# Patient Record
Sex: MALE | Race: ASIAN | Employment: OTHER | URBAN - METROPOLITAN AREA
[De-identification: names, ages, dates, MRNs, and addresses within clinical notes are randomized per-mention and may not be internally consistent; named-entity substitution may affect disease eponyms.]

---

## 2017-08-02 ENCOUNTER — HOSPITAL ENCOUNTER (OUTPATIENT)
Dept: MRI IMAGING | Facility: HOSPITAL | Age: 67
Discharge: HOME | End: 2017-08-02
Attending: SPECIALIST
Payer: COMMERCIAL

## 2017-08-02 DIAGNOSIS — M54.12 CERVICAL RADICULOPATHY: ICD-10-CM

## 2017-08-02 PROCEDURE — 72141 MRI NECK SPINE W/O DYE: CPT | Performed by: SPECIALIST

## 2019-09-30 ENCOUNTER — OFFICE VISIT (OUTPATIENT)
Dept: SURGERY | Facility: CLINIC | Age: 69
End: 2019-09-30
Payer: MEDICARE

## 2019-09-30 VITALS
SYSTOLIC BLOOD PRESSURE: 119 MMHG | HEIGHT: 68 IN | TEMPERATURE: 97 F | BODY MASS INDEX: 25.76 KG/M2 | HEART RATE: 67 BPM | WEIGHT: 170 LBS | DIASTOLIC BLOOD PRESSURE: 75 MMHG

## 2019-09-30 DIAGNOSIS — K64.8 PROLAPSED HEMORRHOIDS: ICD-10-CM

## 2019-09-30 DIAGNOSIS — K64.2 GRADE III INTERNAL HEMORRHOIDS: Primary | ICD-10-CM

## 2019-09-30 PROCEDURE — 46600 DIAGNOSTIC ANOSCOPY SPX: CPT | Performed by: COLON & RECTAL SURGERY

## 2019-09-30 PROCEDURE — 99204 OFFICE O/P NEW MOD 45 MIN: CPT | Performed by: COLON & RECTAL SURGERY

## 2019-09-30 NOTE — H&P
New Patient Visit Note       Active Problems      1. Grade III internal hemorrhoids    2.  Prolapsed hemorrhoids        Chief Complaint   Patient presents with:  Hemorrhoids: NP referred by Dr. Jose Hastings for hemorrhoids, states bleeding with bowel movements, history have been reviewed by me today.     Past Medical History:   Diagnosis Date   • High cholesterol    • Unspecified essential hypertension      Past Surgical History:   Procedure Laterality Date   • HERNIA SURGERY  12/17/2012    Left inguinal direct he for tremors, syncope and weakness. Hematological: Negative for adenopathy. Does not bruise/bleed easily. Psychiatric/Behavioral: Negative for behavioral problems and sleep disturbance.        Physical Findings   /75   Pulse 67   Temp 97.3 °F (36.3 the hemorrhoid is prolapse. He has no pain during defecation. He does not feel obstruction to defecation. He has no diarrhea or constipation. He does see bright red blood per rectum, blood on the toilet paper, and blood in the toilet.   This happens 2019.       No orders of the defined types were placed in this encounter. Imaging & Referrals   None    Follow Up  Return in 3 months (on 12/20/2019).     Su Fernández MD

## 2019-10-01 PROBLEM — K64.8 PROLAPSED HEMORRHOIDS: Status: ACTIVE | Noted: 2019-10-01

## 2019-10-01 PROBLEM — K64.2 GRADE III INTERNAL HEMORRHOIDS: Status: ACTIVE | Noted: 2019-10-01

## 2019-10-01 NOTE — PROCEDURES
Procedure:  Anoscopy    Surgeon: Terrance Tan    Anesthesia: None    Findings: See the progress note attached for all findings    Operative Summary: The patient was placed in a prone position on the proctoscopy table, the hips were flexed in the jackknife p

## 2019-10-01 NOTE — PATIENT INSTRUCTIONS
I am seeing this patient in consultation from Dr. Zeynep Rivera regarding severe anal and rectal symptoms. This patient is a . He rides in his car for 15 hours a day. He has applied ointment to his hemorrhoids.   This gave a small amount of operation for him would be a PPH stapled hemorrhoidectomy. I reviewed the procedure with him. All risks, benefits, complications and alternatives to the proposed operation were fully discussed with the patient.  All questions from the patient were answe

## 2019-12-11 RX ORDER — ACETAMINOPHEN 500 MG
1000 TABLET ORAL ONCE
Status: CANCELLED | OUTPATIENT
Start: 2019-12-11 | End: 2019-12-11

## 2019-12-14 ENCOUNTER — APPOINTMENT (OUTPATIENT)
Dept: LAB | Facility: HOSPITAL | Age: 69
End: 2019-12-14
Payer: MEDICARE

## 2019-12-14 DIAGNOSIS — K64.8 PROLAPSED HEMORRHOIDS: ICD-10-CM

## 2019-12-14 DIAGNOSIS — I10 HTN (HYPERTENSION): ICD-10-CM

## 2019-12-14 DIAGNOSIS — K64.2 GRADE III INTERNAL HEMORRHOIDS: ICD-10-CM

## 2019-12-14 PROCEDURE — 93005 ELECTROCARDIOGRAM TRACING: CPT

## 2019-12-14 PROCEDURE — 80048 BASIC METABOLIC PNL TOTAL CA: CPT

## 2019-12-14 PROCEDURE — 36415 COLL VENOUS BLD VENIPUNCTURE: CPT

## 2019-12-14 PROCEDURE — 93010 ELECTROCARDIOGRAM REPORT: CPT | Performed by: INTERNAL MEDICINE

## 2019-12-20 ENCOUNTER — ANESTHESIA EVENT (OUTPATIENT)
Dept: SURGERY | Facility: HOSPITAL | Age: 69
End: 2019-12-20
Payer: MEDICARE

## 2019-12-20 ENCOUNTER — ANESTHESIA (OUTPATIENT)
Dept: SURGERY | Facility: HOSPITAL | Age: 69
End: 2019-12-20
Payer: MEDICARE

## 2019-12-20 ENCOUNTER — HOSPITAL ENCOUNTER (OUTPATIENT)
Facility: HOSPITAL | Age: 69
Setting detail: HOSPITAL OUTPATIENT SURGERY
Discharge: HOME | End: 2019-12-20
Attending: COLON & RECTAL SURGERY | Admitting: COLON & RECTAL SURGERY
Payer: MEDICARE

## 2019-12-20 VITALS
TEMPERATURE: 98 F | WEIGHT: 168.44 LBS | HEART RATE: 79 BPM | SYSTOLIC BLOOD PRESSURE: 143 MMHG | RESPIRATION RATE: 16 BRPM | OXYGEN SATURATION: 99 % | DIASTOLIC BLOOD PRESSURE: 79 MMHG | HEIGHT: 68 IN | BODY MASS INDEX: 25.53 KG/M2

## 2019-12-20 DIAGNOSIS — I10 HTN (HYPERTENSION): Primary | ICD-10-CM

## 2019-12-20 DIAGNOSIS — K64.2 GRADE III INTERNAL HEMORRHOIDS: ICD-10-CM

## 2019-12-20 DIAGNOSIS — K64.8 PROLAPSED HEMORRHOIDS: ICD-10-CM

## 2019-12-20 PROCEDURE — 06LY4CC OCCLUSION OF HEMORRHOIDAL PLEXUS WITH EXTRALUMINAL DEVICE, PERCUTANEOUS ENDOSCOPIC APPROACH: ICD-10-PCS | Performed by: COLON & RECTAL SURGERY

## 2019-12-20 RX ORDER — ONDANSETRON 2 MG/ML
INJECTION INTRAMUSCULAR; INTRAVENOUS AS NEEDED
Status: DISCONTINUED | OUTPATIENT
Start: 2019-12-20 | End: 2019-12-20 | Stop reason: SURG

## 2019-12-20 RX ORDER — DEXAMETHASONE SODIUM PHOSPHATE 4 MG/ML
VIAL (ML) INJECTION AS NEEDED
Status: DISCONTINUED | OUTPATIENT
Start: 2019-12-20 | End: 2019-12-20 | Stop reason: SURG

## 2019-12-20 RX ORDER — BUPIVACAINE HYDROCHLORIDE AND EPINEPHRINE 5; 5 MG/ML; UG/ML
INJECTION, SOLUTION EPIDURAL; INTRACAUDAL; PERINEURAL AS NEEDED
Status: DISCONTINUED | OUTPATIENT
Start: 2019-12-20 | End: 2019-12-20 | Stop reason: HOSPADM

## 2019-12-20 RX ORDER — HYDROCODONE BITARTRATE AND ACETAMINOPHEN 5; 325 MG/1; MG/1
1 TABLET ORAL EVERY 6 HOURS PRN
Qty: 30 TABLET | Refills: 0 | Status: ON HOLD | OUTPATIENT
Start: 2019-12-20 | End: 2020-12-23

## 2019-12-20 RX ORDER — NALOXONE HYDROCHLORIDE 0.4 MG/ML
80 INJECTION, SOLUTION INTRAMUSCULAR; INTRAVENOUS; SUBCUTANEOUS AS NEEDED
Status: DISCONTINUED | OUTPATIENT
Start: 2019-12-20 | End: 2019-12-20

## 2019-12-20 RX ORDER — HYDROCODONE BITARTRATE AND ACETAMINOPHEN 5; 325 MG/1; MG/1
1 TABLET ORAL AS NEEDED
Status: DISCONTINUED | OUTPATIENT
Start: 2019-12-20 | End: 2019-12-20

## 2019-12-20 RX ORDER — ONDANSETRON 2 MG/ML
4 INJECTION INTRAMUSCULAR; INTRAVENOUS AS NEEDED
Status: DISCONTINUED | OUTPATIENT
Start: 2019-12-20 | End: 2019-12-20

## 2019-12-20 RX ORDER — HEPARIN SODIUM 5000 [USP'U]/ML
5000 INJECTION, SOLUTION INTRAVENOUS; SUBCUTANEOUS ONCE
Status: COMPLETED | OUTPATIENT
Start: 2019-12-20 | End: 2019-12-20

## 2019-12-20 RX ORDER — ACETAMINOPHEN 500 MG
1000 TABLET ORAL EVERY 6 HOURS PRN
Status: ON HOLD | COMMUNITY
End: 2020-12-23

## 2019-12-20 RX ORDER — MIDAZOLAM HYDROCHLORIDE 1 MG/ML
INJECTION INTRAMUSCULAR; INTRAVENOUS AS NEEDED
Status: DISCONTINUED | OUTPATIENT
Start: 2019-12-20 | End: 2019-12-20 | Stop reason: SURG

## 2019-12-20 RX ORDER — HYDROCODONE BITARTRATE AND ACETAMINOPHEN 5; 325 MG/1; MG/1
2 TABLET ORAL AS NEEDED
Status: DISCONTINUED | OUTPATIENT
Start: 2019-12-20 | End: 2019-12-20

## 2019-12-20 RX ORDER — HYDROMORPHONE HYDROCHLORIDE 1 MG/ML
0.4 INJECTION, SOLUTION INTRAMUSCULAR; INTRAVENOUS; SUBCUTANEOUS EVERY 5 MIN PRN
Status: DISCONTINUED | OUTPATIENT
Start: 2019-12-20 | End: 2019-12-20

## 2019-12-20 RX ORDER — SODIUM CHLORIDE, SODIUM LACTATE, POTASSIUM CHLORIDE, CALCIUM CHLORIDE 600; 310; 30; 20 MG/100ML; MG/100ML; MG/100ML; MG/100ML
INJECTION, SOLUTION INTRAVENOUS CONTINUOUS
Status: DISCONTINUED | OUTPATIENT
Start: 2019-12-20 | End: 2019-12-20

## 2019-12-20 RX ORDER — ROCURONIUM BROMIDE 10 MG/ML
INJECTION, SOLUTION INTRAVENOUS AS NEEDED
Status: DISCONTINUED | OUTPATIENT
Start: 2019-12-20 | End: 2019-12-20 | Stop reason: SURG

## 2019-12-20 RX ADMIN — ROCURONIUM BROMIDE 20 MG: 10 INJECTION, SOLUTION INTRAVENOUS at 15:17:00

## 2019-12-20 RX ADMIN — ONDANSETRON 4 MG: 2 INJECTION INTRAMUSCULAR; INTRAVENOUS at 16:07:00

## 2019-12-20 RX ADMIN — DEXAMETHASONE SODIUM PHOSPHATE 8 MG: 4 MG/ML VIAL (ML) INJECTION at 15:42:00

## 2019-12-20 RX ADMIN — MIDAZOLAM HYDROCHLORIDE 2 MG: 1 INJECTION INTRAMUSCULAR; INTRAVENOUS at 15:17:00

## 2019-12-20 NOTE — ANESTHESIA PROCEDURE NOTES
Airway  Date/Time: 12/20/2019 3:20 PM  Urgency: elective      General Information and Staff    Patient location during procedure: OR  Anesthesiologist: Ricky Ambrose MD  Performed: anesthesiologist     Indications and Patient Condition  Indications

## 2019-12-20 NOTE — H&P
Active Problems      1. Grade III internal hemorrhoids    2.  Prolapsed hemorrhoids          Chief Complaint   Patient presents with:  Hemorrhoids: NP referred by Dr. Alicia Kim for hemorrhoids, states bleeding with bowel movements,         History of Present Illn Laterality Date   • HERNIA SURGERY   12/17/2012     Left inguinal direct hernia performed by Dr. George Alston at [facility]         The family history and social history have been reviewed by me today.            Family History   Problem Relation Age of Onset sleep disturbance. Physical Findings   /75   Pulse 67   Temp 97.3 °F (36.3 °C) (Oral)   Ht 68\"   Wt 170 lb (77.1 kg)   BMI 25.85 kg/m²   Physical Exam   Genitourinary: Rectal exam shows external hemorrhoid and internal hemorrhoid.  Rectal exa

## 2019-12-20 NOTE — ANESTHESIA POSTPROCEDURE EVALUATION
[address] [facility] Patient Status:  Hospital Outpatient Surgery   Age/Gender 68year old male MRN [id number]   Location [address] [facility] Attending Christopher Rosales MD   Hosp Day # 0 PCP MD Dilia Pacheco

## 2019-12-20 NOTE — OPERATIVE REPORT
[facility]   Operative Note    Illtylor Aleksandr Location: OR   [facility] [id number] MRN [id number]   Admission Date 12/20/2019 Operation Date 12/20/2019   Attending Physician Tra Cohen MD Operating Physician Monalisa Hodge MD     Pre-Operative Diagnosi held,  pulling the hemorrhoids into the jaws of the stapler. The stapler was fired. A complete ring was identified and sent to pathology for further histopathologic diagnosis.   A running locking suture line was used to reinforce the staple line as we

## 2019-12-20 NOTE — ANESTHESIA PREPROCEDURE EVALUATION
PRE-OP EVALUATION    Patient Name: Kelvin Storey    Pre-op Diagnosis: Grade III internal hemorrhoids [K64.2]  Prolapsed hemorrhoids [K64.8]    Procedure(s):  Procedure for Prolapsed Hemorrhoids STAPLED HEMORROIDECTOMY    Surgeon(s) and Role:     * Pi Never      Drug use: No     Available pre-op labs reviewed.      Lab Results   Component Value Date     12/14/2019    K 4.5 12/14/2019     12/14/2019    CO2 28.0 12/14/2019    BUN 19 (H) 12/14/2019    CREATSERUM 1.20 12/14/2019     (H) 12

## 2020-01-02 ENCOUNTER — TELEPHONE (OUTPATIENT)
Dept: SURGERY | Facility: CLINIC | Age: 70
End: 2020-01-02

## 2020-01-02 RX ORDER — HYDROCODONE BITARTRATE AND ACETAMINOPHEN 5; 325 MG/1; MG/1
1 TABLET ORAL EVERY 6 HOURS PRN
Qty: 10 TABLET | Refills: 0 | Status: SHIPPED | OUTPATIENT
Start: 2020-01-02 | End: 2020-01-06

## 2020-01-02 NOTE — TELEPHONE ENCOUNTER
Spoke with Albert SMITH and ok for additional #10 tablets. Med was KS'E electronically by him. Called patient and notified. He v/u. He is now requesting to also move appt date up. appt moved.     Future Appointments   Date Time Provider Blas Nunez

## 2020-01-02 NOTE — TELEPHONE ENCOUNTER
Patient calling s/p hemorrhoidectomy. C/o pain with sitting. States finished Norco 2 days ago and has been attempting advil without much relief. Doing sitz baths twice daily. Denies fever or chills. Denies rectal drainage. Mild spot-like bleeding.   I

## 2020-01-06 ENCOUNTER — OFFICE VISIT (OUTPATIENT)
Dept: SURGERY | Facility: CLINIC | Age: 70
End: 2020-01-06

## 2020-01-06 VITALS
BODY MASS INDEX: 26 KG/M2 | DIASTOLIC BLOOD PRESSURE: 74 MMHG | TEMPERATURE: 98 F | SYSTOLIC BLOOD PRESSURE: 145 MMHG | HEART RATE: 76 BPM | WEIGHT: 168 LBS

## 2020-01-06 DIAGNOSIS — K64.2 GRADE III INTERNAL HEMORRHOIDS: ICD-10-CM

## 2020-01-06 DIAGNOSIS — K64.8 PROLAPSED HEMORRHOIDS: Primary | ICD-10-CM

## 2020-01-06 PROCEDURE — 99024 POSTOP FOLLOW-UP VISIT: CPT | Performed by: COLON & RECTAL SURGERY

## 2020-01-06 NOTE — PROGRESS NOTES
Post Operative Visit Note       Active Problems  1. Prolapsed hemorrhoids    2.  Grade III internal hemorrhoids         Chief Complaint   Patient presents with:  Post-Op: p/o 12.20 Prolapsed Hemorrhoids STAPLED HEMORROIDECTOMY - states had hard time on satu patient required a further resection of a left posterior lateral mixed complex prolapsing hemorrhoid that was not pulled and with the [facility] technique. Allergies  Rhonda is allergic to azithromycin.     Past Medical / Imleda Fresh / Social / Family Rfl: 0      Review of Systems  The Review of Systems has been reviewed by me during today. Review of Systems   Constitutional: Negative for chills, diaphoresis, fatigue, fever and unexpected weight change.    HENT: Negative for hearing loss, nosebleeds, so hemorrhoids      Plan   This patient underwent an extensive hemorrhoidectomy on December 20, 2019. He had a PPH stapled hemorrhoidectomy.   In addition he had resection of the left posterior lateral external hemorrhoidal complex that required further resec

## 2020-01-06 NOTE — PATIENT INSTRUCTIONS
This patient underwent an extensive hemorrhoidectomy on December 20, 2019. He had a PPH stapled hemorrhoidectomy. In addition he had resection of the left posterior lateral external hemorrhoidal complex that required further resection.     He had consider

## 2020-11-10 ENCOUNTER — TELEPHONE (OUTPATIENT)
Dept: SURGERY | Facility: CLINIC | Age: 70
End: 2020-11-10

## 2020-11-10 ENCOUNTER — OFFICE VISIT (OUTPATIENT)
Dept: SURGERY | Facility: CLINIC | Age: 70
End: 2020-11-10
Payer: MEDICARE

## 2020-11-10 VITALS — DIASTOLIC BLOOD PRESSURE: 70 MMHG | TEMPERATURE: 97 F | SYSTOLIC BLOOD PRESSURE: 150 MMHG | HEART RATE: 79 BPM

## 2020-11-10 DIAGNOSIS — R82.90 URINE FINDING: Primary | ICD-10-CM

## 2020-11-10 DIAGNOSIS — R39.9 LOWER URINARY TRACT SYMPTOMS: ICD-10-CM

## 2020-11-10 DIAGNOSIS — N40.0 ENLARGED PROSTATE: ICD-10-CM

## 2020-11-10 PROCEDURE — 99203 OFFICE O/P NEW LOW 30 MIN: CPT | Performed by: UROLOGY

## 2020-11-10 PROCEDURE — 51798 US URINE CAPACITY MEASURE: CPT | Performed by: UROLOGY

## 2020-11-10 PROCEDURE — 81003 URINALYSIS AUTO W/O SCOPE: CPT | Performed by: UROLOGY

## 2020-11-10 RX ORDER — ALFUZOSIN HYDROCHLORIDE 10 MG/1
10 TABLET, EXTENDED RELEASE ORAL DAILY
COMMUNITY
End: 2021-02-03

## 2020-11-10 RX ORDER — FINASTERIDE 5 MG/1
5 TABLET, FILM COATED ORAL DAILY
Status: ON HOLD | COMMUNITY
End: 2020-12-23

## 2020-11-10 NOTE — PROGRESS NOTES
Rooming Clinician:     Christy Mahoney is a 65year old male. Patient presents with:  Consult: overreactive bladder; started on Finasteride/Alfuzosin since February. Was on [place] but now discontinue.   Urinary Frequency  Urinary Urgency    Miscellaneo Procedure Laterality Date   • HEMORRHOIDECTOMY     • HEMORRHOIDECTOMY WITH [address] N/A 12/20/2019    Performed by Bianca Márquez MD at [facility] MAIN OR   • HERNIA SURGERY  12/17/2012    Left inguinal direct hernia performed by Dr. Nayla Ramirez at [address] [place] [address] 12/14/2019     (H) 12/14/2019    CA 9.1 12/14/2019    ALB 3.9 10/13/2012    ALKPHO 93 10/13/2012    AST 14 (L) 10/13/2012    ALT 29 10/13/2012    POCGLU 108 (H) 12/17/2012       PSA:    Lab Results   Component Value Date    PSA 1.36 05/16/2012

## 2020-11-11 NOTE — PROGRESS NOTES
Your recent urine cytology is normal.  Recommend follow up in the office as directed.     Sincerely,  Macho Wagner MD

## 2020-11-12 ENCOUNTER — TELEPHONE (OUTPATIENT)
Dept: SURGERY | Facility: CLINIC | Age: 70
End: 2020-11-12

## 2020-11-12 NOTE — TELEPHONE ENCOUNTER
Spoke with patient and informed him that his urine cytology was normal.  Follow up as planned. Verbalized understanding.

## 2020-11-12 NOTE — TELEPHONE ENCOUNTER
----- Message from Gentry Fall MD sent at 11/11/2020 12:42 PM CST -----  Your recent urine cytology is normal.  Recommend follow up in the office as directed.     Sincerely,  More German MD

## 2020-11-18 ENCOUNTER — TELEPHONE (OUTPATIENT)
Dept: SURGERY | Facility: CLINIC | Age: 70
End: 2020-11-18

## 2020-11-30 ENCOUNTER — TELEPHONE (OUTPATIENT)
Dept: SURGERY | Facility: CLINIC | Age: 70
End: 2020-11-30

## 2020-11-30 ENCOUNTER — PROCEDURE (OUTPATIENT)
Dept: SURGERY | Facility: CLINIC | Age: 70
End: 2020-11-30
Payer: MEDICARE

## 2020-11-30 VITALS — HEART RATE: 66 BPM | DIASTOLIC BLOOD PRESSURE: 79 MMHG | TEMPERATURE: 98 F | SYSTOLIC BLOOD PRESSURE: 161 MMHG

## 2020-11-30 DIAGNOSIS — N13.8 BPH WITH OBSTRUCTION/LOWER URINARY TRACT SYMPTOMS: ICD-10-CM

## 2020-11-30 DIAGNOSIS — R82.90 URINE FINDING: Primary | ICD-10-CM

## 2020-11-30 DIAGNOSIS — N40.1 BPH WITH OBSTRUCTION/LOWER URINARY TRACT SYMPTOMS: Primary | ICD-10-CM

## 2020-11-30 DIAGNOSIS — N13.8 BPH WITH OBSTRUCTION/LOWER URINARY TRACT SYMPTOMS: Primary | ICD-10-CM

## 2020-11-30 DIAGNOSIS — N40.1 BPH WITH OBSTRUCTION/LOWER URINARY TRACT SYMPTOMS: ICD-10-CM

## 2020-11-30 PROCEDURE — 51741 ELECTRO-UROFLOWMETRY FIRST: CPT | Performed by: UROLOGY

## 2020-11-30 PROCEDURE — 52000 CYSTOURETHROSCOPY: CPT | Performed by: UROLOGY

## 2020-11-30 PROCEDURE — 81003 URINALYSIS AUTO W/O SCOPE: CPT | Performed by: UROLOGY

## 2020-11-30 RX ORDER — CIPROFLOXACIN 500 MG/1
500 TABLET, FILM COATED ORAL ONCE
Status: SHIPPED | OUTPATIENT
Start: 2020-11-30

## 2020-11-30 NOTE — PROGRESS NOTES
Rooming Clinician:     Jovita Newsome is a 65year old male. Patient presents with:  Procedure: here for Uroflow and cystoscopy        HPI:     Patient returns to the office for follow-up examination.   He has a history of a slow urinary stream with s otherwise  SKIN: denies any unusual skin lesions or rashes  RESPIRATORY: denies shortness of breath with exertion  CARDIOVASCULAR: denies chest pain on exertion  GI: denies abdominal pain and denies heartburn  : see HPI  NEURO: no sensory or motor compla  12/14/2019    K 4.5 12/14/2019    PSA 2.2 09/10/2020     12/14/2019    CO2 28.0 12/14/2019     (H) 12/14/2019    CA 9.1 12/14/2019    ALB 3.9 10/13/2012    ALKPHO 93 10/13/2012    AST 14 (L) 10/13/2012    ALT 29 10/13/2012    POCGLU Darren Villareal M.D.   Urology

## 2020-11-30 NOTE — TELEPHONE ENCOUNTER
Surgeon: [facility]/ASC : CHLOÉ  Assistant:   Location:   Procedure: CYSTO, TURP  Anesthesia: SPINAL  Time frame: SOON  Diagnosis: BPH WITH LUTS    Special instructions/equipment:  Postop follow-up:

## 2020-12-01 NOTE — TELEPHONE ENCOUNTER
Patient called back this date requesting surgery be scheduled for 12/23/20 which was done so this date. Reviewed pre-op. Instructions and patient voiced understanding. Also sent instructions thru My Chart this date.  Patient requests call back from lorenza

## 2020-12-01 NOTE — TELEPHONE ENCOUNTER
Called patient this date to schedule surgery. Left message for patient to call back at [phone] to schedule.

## 2020-12-02 NOTE — TELEPHONE ENCOUNTER
Patient has questions regarding incontinence after the TURP. I told him most men regain bladder control and fully recover in 6-12 months. He would like to know in his case what the odds are of him having permanent incontinence.

## 2020-12-04 NOTE — TELEPHONE ENCOUNTER
Patient indicates he had a missed, call, if needed to contact patient again, please call at:[phone],thanks.

## 2020-12-04 NOTE — TELEPHONE ENCOUNTER
Spoke with patient and answered all his questions regarding possible post op issues. Verbalized understanding.

## 2020-12-20 ENCOUNTER — LAB ENCOUNTER (OUTPATIENT)
Dept: LAB | Facility: HOSPITAL | Age: 70
End: 2020-12-20
Attending: UROLOGY
Payer: MEDICARE

## 2020-12-20 DIAGNOSIS — N13.8 BPH WITH OBSTRUCTION/LOWER URINARY TRACT SYMPTOMS: ICD-10-CM

## 2020-12-20 DIAGNOSIS — N40.1 BPH WITH OBSTRUCTION/LOWER URINARY TRACT SYMPTOMS: ICD-10-CM

## 2020-12-20 DIAGNOSIS — N18.30 CHRONIC KIDNEY DISEASE, STAGE III (MODERATE) (HCC): Primary | ICD-10-CM

## 2020-12-20 PROCEDURE — 36415 COLL VENOUS BLD VENIPUNCTURE: CPT

## 2020-12-20 PROCEDURE — 80048 BASIC METABOLIC PNL TOTAL CA: CPT

## 2020-12-21 NOTE — H&P
Patient has a history of some urinary urgency over 1 to 2 years and was subsequently started on alfuzosin which then reduced the urgency. Is able to hold the urine for least a half an hour longer.   Stream had been diminished prior to initiation of David Avilez MD at [facility] MAIN OR   • HERNIA SURGERY   12/17/2012     Left inguinal direct hernia performed by Dr. Mik Bro at [address] [place] [address] History:  Social History    Tobacco Use      Smoking status: Former Smoker        Quit date: 7/1/2010 normal urothelium. Musculature was 2+ trabeculated. The ureteral offices were normal on a normal triangle. There were no stones or tumors seen. The patient tolerated the procedure well.  He was given a postoperative prophylactic dose of antibiotic and the required. Similarly when patients have trans urethral prostate surgery there is a rare risk of incontinence postoperatively but this is an extremely rare complication.   Patient 's are instructed to rest for the 1st several weeks with limited activities an

## 2020-12-23 ENCOUNTER — HOSPITAL ENCOUNTER (OUTPATIENT)
Facility: HOSPITAL | Age: 70
Discharge: HOME | End: 2020-12-24
Attending: UROLOGY | Admitting: UROLOGY
Payer: MEDICARE

## 2020-12-23 ENCOUNTER — ANESTHESIA (OUTPATIENT)
Dept: SURGERY | Facility: HOSPITAL | Age: 70
End: 2020-12-23
Payer: MEDICARE

## 2020-12-23 ENCOUNTER — ANESTHESIA EVENT (OUTPATIENT)
Dept: SURGERY | Facility: HOSPITAL | Age: 70
End: 2020-12-23
Payer: MEDICARE

## 2020-12-23 DIAGNOSIS — N13.8 BPH WITH OBSTRUCTION/LOWER URINARY TRACT SYMPTOMS: Primary | ICD-10-CM

## 2020-12-23 DIAGNOSIS — N40.1 BPH WITH OBSTRUCTION/LOWER URINARY TRACT SYMPTOMS: Primary | ICD-10-CM

## 2020-12-23 DIAGNOSIS — R82.90 URINE FINDING: ICD-10-CM

## 2020-12-23 PROCEDURE — 52601 PROSTATECTOMY (TURP): CPT | Performed by: UROLOGY

## 2020-12-23 PROCEDURE — 0VT08ZZ RESECTION OF PROSTATE, VIA NATURAL OR ARTIFICIAL OPENING ENDOSCOPIC: ICD-10-PCS | Performed by: UROLOGY

## 2020-12-23 RX ORDER — MEPERIDINE HYDROCHLORIDE 25 MG/ML
12.5 INJECTION INTRAMUSCULAR; INTRAVENOUS; SUBCUTANEOUS AS NEEDED
Status: DISCONTINUED | OUTPATIENT
Start: 2020-12-23 | End: 2020-12-23 | Stop reason: HOSPADM

## 2020-12-23 RX ORDER — ACETAMINOPHEN 325 MG/1
650 TABLET ORAL EVERY 4 HOURS PRN
Status: DISCONTINUED | OUTPATIENT
Start: 2020-12-23 | End: 2020-12-24

## 2020-12-23 RX ORDER — PRAVASTATIN SODIUM 20 MG
20 TABLET ORAL NIGHTLY
Status: DISCONTINUED | OUTPATIENT
Start: 2020-12-23 | End: 2020-12-24

## 2020-12-23 RX ORDER — SODIUM CHLORIDE, SODIUM LACTATE, POTASSIUM CHLORIDE, CALCIUM CHLORIDE 600; 310; 30; 20 MG/100ML; MG/100ML; MG/100ML; MG/100ML
INJECTION, SOLUTION INTRAVENOUS CONTINUOUS
Status: DISCONTINUED | OUTPATIENT
Start: 2020-12-23 | End: 2020-12-23

## 2020-12-23 RX ORDER — CEFAZOLIN SODIUM/WATER 2 G/20 ML
2 SYRINGE (ML) INTRAVENOUS ONCE
Status: COMPLETED | OUTPATIENT
Start: 2020-12-23 | End: 2020-12-23

## 2020-12-23 RX ORDER — DEXTROSE, SODIUM CHLORIDE, AND POTASSIUM CHLORIDE 5; .45; .15 G/100ML; G/100ML; G/100ML
INJECTION INTRAVENOUS
Status: COMPLETED
Start: 2020-12-23 | End: 2020-12-23

## 2020-12-23 RX ORDER — HYDROMORPHONE HYDROCHLORIDE 1 MG/ML
0.4 INJECTION, SOLUTION INTRAMUSCULAR; INTRAVENOUS; SUBCUTANEOUS EVERY 5 MIN PRN
Status: DISCONTINUED | OUTPATIENT
Start: 2020-12-23 | End: 2020-12-23 | Stop reason: HOSPADM

## 2020-12-23 RX ORDER — HYDROCODONE BITARTRATE AND ACETAMINOPHEN 5; 325 MG/1; MG/1
2 TABLET ORAL AS NEEDED
Status: DISCONTINUED | OUTPATIENT
Start: 2020-12-23 | End: 2020-12-23 | Stop reason: HOSPADM

## 2020-12-23 RX ORDER — MORPHINE SULFATE 4 MG/ML
2 INJECTION, SOLUTION INTRAMUSCULAR; INTRAVENOUS EVERY 2 HOUR PRN
Status: DISCONTINUED | OUTPATIENT
Start: 2020-12-23 | End: 2020-12-24

## 2020-12-23 RX ORDER — HYDROCODONE BITARTRATE AND ACETAMINOPHEN 5; 325 MG/1; MG/1
1 TABLET ORAL EVERY 4 HOURS PRN
Status: DISCONTINUED | OUTPATIENT
Start: 2020-12-23 | End: 2020-12-24

## 2020-12-23 RX ORDER — HYDROCODONE BITARTRATE AND ACETAMINOPHEN 5; 325 MG/1; MG/1
1 TABLET ORAL AS NEEDED
Status: DISCONTINUED | OUTPATIENT
Start: 2020-12-23 | End: 2020-12-23 | Stop reason: HOSPADM

## 2020-12-23 RX ORDER — MORPHINE SULFATE 4 MG/ML
4 INJECTION, SOLUTION INTRAMUSCULAR; INTRAVENOUS EVERY 2 HOUR PRN
Status: DISCONTINUED | OUTPATIENT
Start: 2020-12-23 | End: 2020-12-24

## 2020-12-23 RX ORDER — DEXAMETHASONE SODIUM PHOSPHATE 4 MG/ML
VIAL (ML) INJECTION AS NEEDED
Status: DISCONTINUED | OUTPATIENT
Start: 2020-12-23 | End: 2020-12-23 | Stop reason: SURG

## 2020-12-23 RX ORDER — ACETAMINOPHEN 500 MG
1000 TABLET ORAL ONCE AS NEEDED
Status: DISCONTINUED | OUTPATIENT
Start: 2020-12-23 | End: 2020-12-23 | Stop reason: HOSPADM

## 2020-12-23 RX ORDER — MORPHINE SULFATE 4 MG/ML
1 INJECTION, SOLUTION INTRAMUSCULAR; INTRAVENOUS EVERY 2 HOUR PRN
Status: DISCONTINUED | OUTPATIENT
Start: 2020-12-23 | End: 2020-12-24

## 2020-12-23 RX ORDER — LISINOPRIL 2.5 MG/1
2.5 TABLET ORAL 2 TIMES DAILY
Status: DISCONTINUED | OUTPATIENT
Start: 2020-12-23 | End: 2020-12-24

## 2020-12-23 RX ORDER — ACETAMINOPHEN 500 MG
1000 TABLET ORAL ONCE
Status: DISCONTINUED | OUTPATIENT
Start: 2020-12-23 | End: 2020-12-23

## 2020-12-23 RX ORDER — SODIUM CHLORIDE, SODIUM LACTATE, POTASSIUM CHLORIDE, CALCIUM CHLORIDE 600; 310; 30; 20 MG/100ML; MG/100ML; MG/100ML; MG/100ML
INJECTION, SOLUTION INTRAVENOUS CONTINUOUS
Status: DISCONTINUED | OUTPATIENT
Start: 2020-12-23 | End: 2020-12-23 | Stop reason: HOSPADM

## 2020-12-23 RX ORDER — ZOLPIDEM TARTRATE 5 MG/1
5 TABLET ORAL NIGHTLY PRN
Status: DISCONTINUED | OUTPATIENT
Start: 2020-12-23 | End: 2020-12-24

## 2020-12-23 RX ORDER — CEFAZOLIN SODIUM/WATER 2 G/20 ML
SYRINGE (ML) INTRAVENOUS
Status: DISPENSED
Start: 2020-12-23 | End: 2020-12-23

## 2020-12-23 RX ORDER — ONDANSETRON 2 MG/ML
INJECTION INTRAMUSCULAR; INTRAVENOUS AS NEEDED
Status: DISCONTINUED | OUTPATIENT
Start: 2020-12-23 | End: 2020-12-23 | Stop reason: SURG

## 2020-12-23 RX ORDER — MIDAZOLAM HYDROCHLORIDE 1 MG/ML
INJECTION INTRAMUSCULAR; INTRAVENOUS AS NEEDED
Status: DISCONTINUED | OUTPATIENT
Start: 2020-12-23 | End: 2020-12-23 | Stop reason: SURG

## 2020-12-23 RX ORDER — NALOXONE HYDROCHLORIDE 0.4 MG/ML
80 INJECTION, SOLUTION INTRAMUSCULAR; INTRAVENOUS; SUBCUTANEOUS AS NEEDED
Status: DISCONTINUED | OUTPATIENT
Start: 2020-12-23 | End: 2020-12-23 | Stop reason: HOSPADM

## 2020-12-23 RX ORDER — DEXTROSE, SODIUM CHLORIDE, AND POTASSIUM CHLORIDE 5; .45; .15 G/100ML; G/100ML; G/100ML
INJECTION INTRAVENOUS CONTINUOUS
Status: DISCONTINUED | OUTPATIENT
Start: 2020-12-23 | End: 2020-12-24

## 2020-12-23 RX ORDER — ONDANSETRON 2 MG/ML
4 INJECTION INTRAMUSCULAR; INTRAVENOUS AS NEEDED
Status: DISCONTINUED | OUTPATIENT
Start: 2020-12-23 | End: 2020-12-23 | Stop reason: HOSPADM

## 2020-12-23 RX ORDER — ATROPA BELLADONNA AND OPIUM 16.2; 6 MG/1; MG/1
1 SUPPOSITORY RECTAL EVERY 6 HOURS PRN
Status: DISCONTINUED | OUTPATIENT
Start: 2020-12-23 | End: 2020-12-24

## 2020-12-23 RX ORDER — HYDROCODONE BITARTRATE AND ACETAMINOPHEN 5; 325 MG/1; MG/1
2 TABLET ORAL EVERY 4 HOURS PRN
Status: DISCONTINUED | OUTPATIENT
Start: 2020-12-23 | End: 2020-12-24

## 2020-12-23 RX ORDER — ACETAMINOPHEN 500 MG
1000 TABLET ORAL ONCE
Status: DISCONTINUED | OUTPATIENT
Start: 2020-12-23 | End: 2020-12-24

## 2020-12-23 RX ORDER — BUPIVACAINE HYDROCHLORIDE 7.5 MG/ML
INJECTION, SOLUTION INTRASPINAL AS NEEDED
Status: DISCONTINUED | OUTPATIENT
Start: 2020-12-23 | End: 2020-12-23 | Stop reason: SURG

## 2020-12-23 RX ADMIN — BUPIVACAINE HYDROCHLORIDE 1.2 ML: 7.5 INJECTION, SOLUTION INTRASPINAL at 07:48:00

## 2020-12-23 RX ADMIN — DEXAMETHASONE SODIUM PHOSPHATE 4 MG: 4 MG/ML VIAL (ML) INJECTION at 08:03:00

## 2020-12-23 RX ADMIN — SODIUM CHLORIDE, SODIUM LACTATE, POTASSIUM CHLORIDE, CALCIUM CHLORIDE: 600; 310; 30; 20 INJECTION, SOLUTION INTRAVENOUS at 09:02:00

## 2020-12-23 RX ADMIN — MIDAZOLAM HYDROCHLORIDE 2 MG: 1 INJECTION INTRAMUSCULAR; INTRAVENOUS at 07:35:00

## 2020-12-23 RX ADMIN — ONDANSETRON 4 MG: 2 INJECTION INTRAMUSCULAR; INTRAVENOUS at 08:18:00

## 2020-12-23 RX ADMIN — CEFAZOLIN SODIUM/WATER 2 G: 2 G/20 ML SYRINGE (ML) INTRAVENOUS at 07:55:00

## 2020-12-23 NOTE — ANESTHESIA POSTPROCEDURE EVALUATION
[address] [facility] Patient Status:  Outpatient in a Bed   Age/Gender 65year old male MRN [id number]   [facility] SURGERY Attending Polo Maciel MD   Hosp Day # 0 PCP Dilia Stafford MD       Anesthesia Post-op Note

## 2020-12-23 NOTE — ANESTHESIA PROCEDURE NOTES
Spinal Block  Performed by: Favian Hairston CRNA  Authorized by: Mariela Lockhart MD       General Information and Staff    Start Time:   Anesthesiologist: Mariela Lockhart MD  CRNA:  Favian Hairston CRNA  Performed by:   Anesthesiologist and CRNA  [facility]

## 2020-12-23 NOTE — OPERATIVE REPORT
Operative Note    Patient Name: Mikey Cottrell    Date of Procedure: 12/23/2020    Preoperative Diagnosis: BPH with obstruction/lower urinary tract symptoms [N40.1, N13.8]    Postoperative Diagnosis: BPH with obstruction/lower urinary tract sympto concurred.   Then the 25 [place] cystoscope was introduced into the bladder at which time examination of the distal urethra was found to be normal.  The prostatic urethra was obstructed from bilateral lateral lobe hyperplasia with prominence of the left late irrigated clear. Estimated blood loss was no more than 50 cc and the patient was returned to the recovery room in stable and satisfactory condition. Shona Lanes. Woodroe Alpers, M.D.

## 2020-12-23 NOTE — ANESTHESIA PREPROCEDURE EVALUATION
PRE-OP EVALUATION    Patient Name: Oracio Platt    Pre-op Diagnosis: BPH with obstruction/lower urinary tract symptoms [N40.1, N13.8]    Procedure(s):  Cystoscopy Transurethral resection of prostate    Surgeon(s) and Role:     * Pasciak, Yasmin Cooler, M 12/20/2019    Performed by Rashid Batista MD at [facility] MAIN OR   • HERNIA SURGERY  12/17/2012    Left inguinal direct hernia performed by Dr. Rachael Baez at [place] [address] History    Tobacco Use      Smoking status: Former Smoker        Quit date: 7/1/20

## 2020-12-23 NOTE — PLAN OF CARE
Problem: Patient/Family Goals  Goal: Patient/Family Long Term Goal  Description: Patient's Long Term Goal: GO HOME  Interventions:  -IV FLUIDS  -ADVANCE DIET AS TOLERATED  -VS Q 4 HRS  -CBI  - See additional Care Plan goals for specific interventions  Ou Absence of urinary retention  Description: INTERVENTIONS:  - Assess patient’s ability to void and empty bladder  - Monitor intake/output and perform bladder scan as needed  - Follow urinary retention protocol/standard of care  - Consider collaborating with

## 2020-12-23 NOTE — PROGRESS NOTES
NURSING ADMISSION NOTE      Patient admitted via BED FROM PACU Oriented to room. Safety precautions initiated. Bed in low position. Call light in reach. ALERT & ORIENTED X4 . DENIES PAIN.WITH ONGOING CBI VIA 3 WAY  HOLT DRAINING CLEAR CRANBERRY R

## 2020-12-24 ENCOUNTER — TELEPHONE (OUTPATIENT)
Dept: SURGERY | Facility: CLINIC | Age: 70
End: 2020-12-24

## 2020-12-24 VITALS
RESPIRATION RATE: 18 BRPM | OXYGEN SATURATION: 99 % | SYSTOLIC BLOOD PRESSURE: 136 MMHG | DIASTOLIC BLOOD PRESSURE: 63 MMHG | BODY MASS INDEX: 25.07 KG/M2 | TEMPERATURE: 98 F | HEART RATE: 55 BPM | HEIGHT: 68 IN | WEIGHT: 165.38 LBS

## 2020-12-24 NOTE — DISCHARGE SUMMARY
patient was admitted on December 23, 2020 and underwent TURP for symptoms of BPH with LUTS uneventfully. Urine on POD 1 was clear to light pink off traction. He was in stable and satisfactory condition tolerating diet well and without pain.   His vital si

## 2020-12-24 NOTE — CM/SW NOTE
SW completed a chart review to identify needs and provide discharge planning if needed. SW identified that pt lives at home. No discharge needs identified at this time but SW to remain available to assist if needed.     Social work to remain available fo

## 2020-12-24 NOTE — PLAN OF CARE
Pt is a/ox4, room air, , cbi running slow draining cherry no clots. Denies n/v, pain at this time. Tolerating full liquid diet, will advance in AM. Ivf infusing. Denies numbness and tingling in R leg. Bedrest. Bowel sounds present, lungs clear bilat.  Pt needed  - Establish a toileting routine/schedule  - Consider collaborating with pharmacy to review patient's medication profile  12/23/2020 2334 by Sage Park, RN  Outcome: Progressing  12/23/2020 2334 by Sage Park, RN  Outcome: Daiana Redding interventions unsuccessful or patient reports new pain  - Anticipate increased pain with activity and pre-medicate as appropriate  12/23/2020 2334 by Audrey Link RN  Outcome: Progressing  12/23/2020 2334 by Audrey Link, RN  Outcome: Progressing

## 2020-12-24 NOTE — PLAN OF CARE
NURSING DISCHARGE NOTE    Discharged Home via Wheelchair. Accompanied by Support staff  Belongings Taken by patient/family. Pt verbalized understanding of d/c instructions. IV D/Cd.  Pt did a teachback of mora catheter management, and fully underst bladder  - Monitor intake/output and perform bladder scan as needed  - Follow urinary retention protocol/standard of care  - Consider collaborating with pharmacy to review patient's medication profile  - Implement strategies to promote bladder emptying  August Johnson

## 2020-12-24 NOTE — PROGRESS NOTES
Patient feels well. No pain. Tolerating diet well. Plan for discharge home today with mora to have mora removed in the am in the office.

## 2020-12-28 NOTE — PROGRESS NOTES
Your recent prostate specimen is normal.  There was no evidence of cancer in the tissue removed. Recommend follow up in the office as directed.     Sincerely,  Ericka Church MD

## 2020-12-30 ENCOUNTER — TELEPHONE (OUTPATIENT)
Dept: SURGERY | Facility: CLINIC | Age: 70
End: 2020-12-30

## 2020-12-30 NOTE — TELEPHONE ENCOUNTER
I called the pt back. He stated that he was unsure if a message from our office is recent or from a long time ago.  I discussed with him that there is no record of a recent call to the patient and I verbally confirmed with everyone in the office that they d

## 2021-01-12 ENCOUNTER — OFFICE VISIT (OUTPATIENT)
Dept: SURGERY | Facility: CLINIC | Age: 71
End: 2021-01-12
Payer: MEDICARE

## 2021-01-12 VITALS — HEART RATE: 81 BPM | TEMPERATURE: 97 F | DIASTOLIC BLOOD PRESSURE: 73 MMHG | SYSTOLIC BLOOD PRESSURE: 129 MMHG

## 2021-01-12 DIAGNOSIS — R82.90 URINE FINDING: Primary | ICD-10-CM

## 2021-01-12 DIAGNOSIS — N40.1 BPH WITH OBSTRUCTION/LOWER URINARY TRACT SYMPTOMS: ICD-10-CM

## 2021-01-12 DIAGNOSIS — N13.8 BPH WITH OBSTRUCTION/LOWER URINARY TRACT SYMPTOMS: ICD-10-CM

## 2021-01-12 LAB
APPEARANCE: CLEAR
MULTISTIX LOT#: 5077 NUMERIC
PH, URINE: 5.5 (ref 4.5–8)
SPECIFIC GRAVITY: 1 (ref 1–1.03)
URINE-COLOR: YELLOW
UROBILINOGEN,SEMI-QN: 0.2 MG/DL (ref 0–1.9)

## 2021-01-12 PROCEDURE — 81003 URINALYSIS AUTO W/O SCOPE: CPT | Performed by: UROLOGY

## 2021-01-12 PROCEDURE — 99024 POSTOP FOLLOW-UP VISIT: CPT | Performed by: UROLOGY

## 2021-01-12 PROCEDURE — 1111F DSCHRG MED/CURRENT MED MERGE: CPT | Performed by: UROLOGY

## 2021-01-12 NOTE — PROGRESS NOTES
Rooming Clinician:     Mayur Hodgson is a 65year old male. Patient presents with: Follow - Up        HPI:     Returns for postoperative follow-up after TURP. Currently he is voiding well. Stream is strong and steady.   He continues to drink lot Size: large)   Pulse 81   Temp 97.3 °F (36.3 °C)   GENERAL: well developed, well nourished,in no apparent distress  SKIN: no rashes,no suspicious lesions  HEENT: atraumatic, normocephalic,ears and throat are clear  NECK: supple  LUNGS: normal respiratory m

## 2021-01-13 ENCOUNTER — PATIENT MESSAGE (OUTPATIENT)
Dept: SURGERY | Facility: CLINIC | Age: 71
End: 2021-01-13

## 2021-01-15 NOTE — TELEPHONE ENCOUNTER
Below is patient's message sent via [employer]:     From: Rex Gonzalez  To: Cheyanne Naranjo MD  Sent: 1/13/2021  3:49 PM CST  Subject: Non-Urgent Medical Question    My Colonoscopy test is overdue and the gastroenterologist has scheduled me for the p

## 2021-02-02 ENCOUNTER — LAB ENCOUNTER (OUTPATIENT)
Dept: LAB | Facility: HOSPITAL | Age: 71
End: 2021-02-02
Attending: INTERNAL MEDICINE
Payer: MEDICARE

## 2021-02-02 DIAGNOSIS — Z01.818 PRE-OP TESTING: ICD-10-CM

## 2021-02-02 LAB — SARS-COV-2 RNA RESP QL NAA+PROBE: NOT DETECTED

## 2021-02-05 PROBLEM — Z86.010 PERSONAL HISTORY OF COLONIC POLYPS: Status: ACTIVE | Noted: 2021-02-05

## 2021-04-09 ENCOUNTER — OFFICE VISIT (OUTPATIENT)
Dept: SURGERY | Facility: CLINIC | Age: 71
End: 2021-04-09
Payer: MEDICARE

## 2021-04-09 VITALS — TEMPERATURE: 97 F | DIASTOLIC BLOOD PRESSURE: 75 MMHG | HEART RATE: 76 BPM | SYSTOLIC BLOOD PRESSURE: 139 MMHG

## 2021-04-09 DIAGNOSIS — R35.0 BENIGN PROSTATIC HYPERPLASIA WITH URINARY FREQUENCY: ICD-10-CM

## 2021-04-09 DIAGNOSIS — N40.0 ENLARGED PROSTATE: ICD-10-CM

## 2021-04-09 DIAGNOSIS — R82.90 URINE FINDING: Primary | ICD-10-CM

## 2021-04-09 DIAGNOSIS — N40.1 BENIGN PROSTATIC HYPERPLASIA WITH URINARY FREQUENCY: ICD-10-CM

## 2021-04-09 PROCEDURE — 81003 URINALYSIS AUTO W/O SCOPE: CPT | Performed by: UROLOGY

## 2021-04-09 PROCEDURE — 99213 OFFICE O/P EST LOW 20 MIN: CPT | Performed by: UROLOGY

## 2021-04-09 RX ORDER — ATORVASTATIN CALCIUM 10 MG/1
10 TABLET, FILM COATED ORAL DAILY
COMMUNITY
Start: 2021-01-20

## 2021-04-09 NOTE — PROGRESS NOTES
Rooming Clinician:     Lesly Healy is a 65year old male. Patient presents with: Follow - Up: s/p TURP 12/2020.   BPH  Stream: strong  Daytime frequency: q 2 hours, ugency is gone  Straining to urinate: Yes  Empty after urination: Yes  Post void d heartburn  : see HPI  NEURO: no sensory or motor complaint    EXAM:     /75   Pulse 76   Temp 97 °F (36.1 °C)   GENERAL: well developed, well nourished,in no apparent distress  SKIN: no rashes,no suspicious lesions  HEENT: atraumatic, normocephalic

## 2021-06-24 ENCOUNTER — OFFICE VISIT (OUTPATIENT)
Dept: SURGERY | Facility: CLINIC | Age: 71
End: 2021-06-24
Payer: MEDICARE

## 2021-06-24 VITALS — DIASTOLIC BLOOD PRESSURE: 75 MMHG | HEART RATE: 82 BPM | SYSTOLIC BLOOD PRESSURE: 153 MMHG | TEMPERATURE: 97 F

## 2021-06-24 DIAGNOSIS — N53.14 RETROGRADE EJACULATION: Primary | ICD-10-CM

## 2021-06-24 PROCEDURE — 99213 OFFICE O/P EST LOW 20 MIN: CPT | Performed by: UROLOGY

## 2021-06-24 NOTE — PROGRESS NOTES
Rooming Clinician:     Lesly Healy is a 65year old male.   Patient presents with:  Erectile Dysfunction - started after surgery  S/P TURP in Dec 2020      Erectile Dysfunction:  Frequency of erections: Normal  Quality of erections: 8 (10 is normal) abdominal pain and denies heartburn  : see HPI  NEURO: no sensory or motor complaint    EXAM:     /75 (BP Location: Right arm)   Pulse 82   Temp 97.4 °F (36.3 °C) (Temporal)   GENERAL: well developed, well nourished,in no apparent distress  SKIN: n

## 2022-05-05 ENCOUNTER — OFFICE VISIT (OUTPATIENT)
Dept: SURGERY | Facility: CLINIC | Age: 72
End: 2022-05-05
Payer: MEDICARE

## 2022-05-05 DIAGNOSIS — N40.0 ENLARGED PROSTATE: ICD-10-CM

## 2022-05-05 DIAGNOSIS — R82.90 URINE FINDING: Primary | ICD-10-CM

## 2022-05-05 LAB
APPEARANCE: CLEAR
BILIRUBIN: NEGATIVE
GLUCOSE (URINE DIPSTICK): NEGATIVE MG/DL
KETONES (URINE DIPSTICK): NEGATIVE MG/DL
LEUKOCYTES: NEGATIVE
MULTISTIX LOT#: NORMAL NUMERIC
NITRITE, URINE: NEGATIVE
OCCULT BLOOD: NEGATIVE
PH, URINE: 7 (ref 4.5–8)
PROTEIN (URINE DIPSTICK): NEGATIVE MG/DL
SPECIFIC GRAVITY: 1.01 (ref 1–1.03)
URINE-COLOR: YELLOW
UROBILINOGEN,SEMI-QN: 0.2 MG/DL (ref 0–1.9)

## 2022-05-05 PROCEDURE — 99213 OFFICE O/P EST LOW 20 MIN: CPT | Performed by: UROLOGY

## 2022-05-05 PROCEDURE — 81003 URINALYSIS AUTO W/O SCOPE: CPT | Performed by: UROLOGY

## 2022-05-05 RX ORDER — SILDENAFIL 100 MG/1
100 TABLET, FILM COATED ORAL
Qty: 30 TABLET | Refills: 5 | Status: SHIPPED | OUTPATIENT
Start: 2022-05-05

## 2022-05-05 RX ORDER — LISINOPRIL 2.5 MG/1
2.5 TABLET ORAL DAILY
COMMUNITY
Start: 2022-04-18

## 2024-02-06 ENCOUNTER — ORDER TRANSCRIPTION (OUTPATIENT)
Dept: ADMINISTRATIVE | Facility: HOSPITAL | Age: 74
End: 2024-02-06

## 2024-02-06 DIAGNOSIS — I10 HTN (HYPERTENSION): Primary | ICD-10-CM

## 2024-02-06 DIAGNOSIS — E78.5 DYSLIPIDEMIA: ICD-10-CM

## 2024-03-11 ENCOUNTER — ORDER TRANSCRIPTION (OUTPATIENT)
Dept: ADMINISTRATIVE | Facility: HOSPITAL | Age: 74
End: 2024-03-11

## 2024-03-11 DIAGNOSIS — Z13.6 SCREENING FOR CARDIOVASCULAR CONDITION: Primary | ICD-10-CM

## 2024-03-14 ENCOUNTER — HOSPITAL ENCOUNTER (OUTPATIENT)
Dept: CT IMAGING | Facility: HOSPITAL | Age: 74
Discharge: HOME | End: 2024-03-14
Attending: SPECIALIST

## 2024-03-14 VITALS
DIASTOLIC BLOOD PRESSURE: 70 MMHG | HEIGHT: 68 IN | WEIGHT: 165 LBS | SYSTOLIC BLOOD PRESSURE: 120 MMHG | BODY MASS INDEX: 25.01 KG/M2

## 2024-03-14 DIAGNOSIS — Z13.6 SCREENING FOR CARDIOVASCULAR CONDITION: ICD-10-CM

## 2024-03-14 NOTE — PROGRESS NOTES
Date of Service 3/14/2024    CHACE GARCIA  Date of Birth 1950    Patient Age: 73 year old    PCP: Shiv Lee MD  [address] [facility] [address]    Heart Scan Consult  Preliminary Heart Scan Score: 359  Denies CP or SOB    Previous Screening  Heart Scan Completed Previously: No        Peripheral Vascular Scan Completed Previously: Yes  Year of last PV screenin       Risk Factors  Personal Risk Factors  Non-alterable Risk Factors: Age  Alterable Risk Factors: High Blood Pressure;Abnormal Cholesterol;Stress      Body Mass Index  Body mass index is 25.09 kg/m².    Blood Pressure     /70 Comment: takes lisinopril     (Normal =< 120/80,  Elevated = 120-129/ >80,  High Stage1 130-139/80-89 , Stage2 >140/>90)    Lipid Profile  Not completed, patient declined testing.    Cholesterol Goals  Value   Total  =< 200   HDL  = > 45 Men = > 55 Women   LDL   =< 100   Triglycerides  =< 150       Glucose and Hemoglobin A1C    (Normal Fasting Glucose < 100mg/dl ) no labs available     Nurse Review  Risk factor information and results reviewed with Nurse: Yes    Recommended Follow Up:  Consult your physician regarding:: Final Heart Scan Report;Discuss potential for Incidental Finding;Discuss Potential for Score Variance      Recommendations for Change:  Nutrition Changes: Low Saturated Fat;Low Fat Dairy;Low Salt Eating;Increase Fiber    Cholesterol Modification (goal of therapy depends upon your risk):  (declined testing states MD just did and is on a cholesterol medication)    Exercise:  (exercises every day)         Weight Management: Maintain Current Weight    Stress Management: Adopt Stress Management Techniques    Repeat Heart Scan: Discuss with your Physician              [place] Recommended Resources:  Recommended Resources: PV Screening  Recommended PV Screening: Abdomen;Carotids         Georgiana BOYD RN        Please Contact the Nurse Heart Line with any Questions or Concerns [phone].

## 2024-07-10 ENCOUNTER — LAB ENCOUNTER (OUTPATIENT)
Dept: LAB | Facility: HOSPITAL | Age: 74
End: 2024-07-10
Attending: SPECIALIST
Payer: MEDICARE

## 2024-07-10 DIAGNOSIS — I25.10 CAD (CORONARY ARTERY DISEASE): Primary | ICD-10-CM

## 2024-07-10 DIAGNOSIS — D64.9 ANEMIA: ICD-10-CM

## 2024-07-10 DIAGNOSIS — I25.118 ATHSCL HEART DISEASE OF NATIVE COR ART W OTH ANG PCTRS (HCC): ICD-10-CM

## 2024-07-10 DIAGNOSIS — R19.7 DIARRHEA OF PRESUMED INFECTIOUS ORIGIN: ICD-10-CM

## 2024-07-10 PROCEDURE — 87209 SMEAR COMPLEX STAIN: CPT

## 2024-07-10 PROCEDURE — 82656 EL-1 FECAL QUAL/SEMIQ: CPT

## 2024-07-10 PROCEDURE — 87177 OVA AND PARASITES SMEARS: CPT

## 2024-07-15 LAB — PANCREATIC ELAST FECAL: >800 UG ELAST./G

## 2024-07-25 ENCOUNTER — HOSPITAL ENCOUNTER (OUTPATIENT)
Dept: CV DIAGNOSTICS | Facility: HOSPITAL | Age: 74
Discharge: HOME | End: 2024-07-25
Attending: SPECIALIST
Payer: MEDICARE

## 2024-07-25 DIAGNOSIS — I25.10 CAD (CORONARY ARTERY DISEASE): ICD-10-CM

## 2024-07-25 PROCEDURE — 78452 HT MUSCLE IMAGE SPECT MULT: CPT | Performed by: SPECIALIST

## 2024-07-25 PROCEDURE — 93017 CV STRESS TEST TRACING ONLY: CPT | Performed by: SPECIALIST

## 2024-07-25 PROCEDURE — 93018 CV STRESS TEST I&R ONLY: CPT | Performed by: SPECIALIST

## (undated) DEVICE — SOL H2O 1000ML BTL

## (undated) DEVICE — HF-RESECTION ELECTRODE PLASMALOOP LOOP, LARGE, 24 FR., 12°/16°, ESG TURIS: Brand: OLYMPUS

## (undated) DEVICE — COVER,BOOT,FOAM,NON-SKID,HOOK-LOOP,XLG: Brand: MEDLINE INDUSTRIES, INC.

## (undated) DEVICE — GYN CDS: Brand: MEDLINE INDUSTRIES, INC.

## (undated) DEVICE — Device

## (undated) DEVICE — KENDALL SCD EXPRESS SLEEVES, KNEE LENGTH, MEDIUM: Brand: KENDALL SCD

## (undated) DEVICE — FLUID JUMPSUIT DISP SD-100

## (undated) DEVICE — SOL  .9 1000ML BTL

## (undated) DEVICE — SYRINGE 30ML LL TIP

## (undated) DEVICE — #15 STERILE STAINLESS BLADE: Brand: STERILE STAINLESS BLADES

## (undated) DEVICE — PLASTC TOOMEY SYRNG DISP

## (undated) DEVICE — URINE DRAINAGE BAG,BAG, NEEDLE SAMPLING, DRAIN TUBE: Brand: DOVER

## (undated) DEVICE — CYSTO CDS-LF: Brand: MEDLINE INDUSTRIES, INC.

## (undated) DEVICE — SUTURE PROLENE 1 CT-1

## (undated) DEVICE — HF-RESECTION ELECTRODE PLASMANEEDLE NEEDLE, RIGHT-ANGLED, 24 FR., 12°-30°, ESG TURIS: Brand: OLYMPUS

## (undated) DEVICE — 1071 S-DRP URO STLE-GAMA 10/BX,4X/C: Brand: STERI-DRAPE™

## (undated) DEVICE — CAUTERY PENCIL

## (undated) DEVICE — STERILE POLYISOPRENE POWDER-FREE SURGICAL GLOVES: Brand: PROTEXIS

## (undated) DEVICE — SPONGE STICK WITH PVP-I: Brand: KENDALL

## (undated) DEVICE — SUTURE ETHILON 2-0 FS

## (undated) DEVICE — VIOLET BRAIDED (POLYGLACTIN 910), SYNTHETIC ABSORBABLE SUTURE: Brand: COATED VICRYL

## (undated) DEVICE — 33MM PROXIMATE PPH, PROCEDURE FOR PROLAPSE AND HEMORRHOIDS SET: Brand: PROXIMATE

## (undated) DEVICE — SOL  .9 3000ML

## (undated) NOTE — LETTER
20    Patient: Donya Garcia  : 1950 Visit date: 2020    Dear  Dr. Sebas Luevano MD,    Thank you for referring Donya Garcia to my practice. Please find my assessment and plan below.           Assessment   Prolapsed hemorrhoids  (p

## (undated) NOTE — LETTER
4/9/2021          To Whom It May Concern:    I am a urologist and Karen Koo is currently under my medical care. He has frequency. If you require additional information please contact our office.         Sincerely,    Zulema Lara MD

## (undated) NOTE — LETTER
[facility] Testing Department  Phone: [phone]  OUTSIDE TESTING RESULT REQUEST      TO:  Dr. Coleen Gill Date: 12/15/20    FAX #: [phone]     IMPORTANT: FOR YOUR IMMEDIATE ATTENTION    Please FAX all test results listed bel

## (undated) NOTE — LETTER
10/01/19    Patient: Tony Walton  : 1950 Visit date: 2019    Dear  Dr. Alicia Sanchez MD,    Thank you for referring Tony Walton to my practice. Please find my assessment and plan below.              Assessment   Grade III internal hemorr hemorrhoid is reducible with moderate effort. There are no fissures or fistulae. There is no abscess. The prostate is normal.  There is no proctitis or Crohn's disease. Anal tone is 3/4 basal, 3/4 maximum squeeze pressure.     This patient is aggravated